# Patient Record
(demographics unavailable — no encounter records)

---

## 2024-11-14 NOTE — ASSESSMENT
Have You Had Botox Before?: has not had botox [FreeTextEntry1] : We reviewed the images and reports as well as his labs  Daughter is with him and wife on the phone x 45 minutes  We discussed options  Renal Mass: We reviewed the images and reports. We reviewed the possible underlying histology of solid enhancing renal masses. We discussed the risk of malignancy vs benign. We discussed the possibility/ role of percutaneous biopsy, with the associated risks, benefits, complications and accuracy issues (risk of false negative). The natural history and biology of renal cell carcinoma was discussed. Options were reviewed including, not limited to, active surveillance, surgical extirpation and ablation. The risk of tumor growth and metastasis on active surveillance were reviewed. Options were reviewed including, not limited to, active surveillance (AS) surgical extirpation and ablation. The risk of tumor growth and metastasis on AS could mean missing the opportunity for cure was discussed. With respect to treatment, we reviewed ablation (cryoablation, radiofrequency ablation) risks of recurrence, opportunities for salvage treatment and imaging requirements for follow up based on the pathological findings. Oncologic outcomes for ablation were reviewed. With respect for surgery, we reviewed nephron sparing surgery vs radical nephrectomy as well as minimally invasive laparoscopic approach surgery and the possibility of converting to an open procedure. Risks of surgical complications were reviewed, including but not limited to bleeding/ life threatening hemorrhage, vascular/bowel/adjacent visceral organ injury, trocar access injury, the possibility of recognized vs unrecognized delayed recognition injury, risks of thermal /blunt/sharp/retraction injury. We reviewed the risk of DVT, PE, MI, death, risks of cardiopulmonary/anesthesia related complications, positional injury, infection/collection/abscess, wound complications/dehiscence, urinoma/fistula, ureteral injury/obstruction as well as other complications.  Chest CT ordered  He has urethral strictures  PVR  10 cc   He is on Flomax Discussed possibility of post op retention  Additional History: Patient is here for Botox in forehead.

## 2024-11-14 NOTE — HISTORY OF PRESENT ILLNESS
[Weak Stream] : weak stream [FreeTextEntry1] : Elevating creatinine 1.38 Renal ultrasound done reveals a renal mass Confirmed on MRI as a lefty 3.7 cm x 3 cm xc3 cm exophytic cortical mass Parapelvic cyst as well here for options and discussion with wife and daughter